# Patient Record
(demographics unavailable — no encounter records)

---

## 2025-07-24 NOTE — ASSESSMENT
[FreeTextEntry1] : Mr. Godinez is a 20-year-old who presents with subacute headaches, imbalance, tremulousness, loss of dexterity and rare tinnitus.  His examination is notable for mild motor slowness, hypertonia and significant hyperreflexia.  He is not dysarthric and does not exhibit ocular motility abnormalities or significant ataxia.  It is possible that his symptoms are the consequence of a Chiari I malformation.  I would like to review his preoperative imaging studies.  In light of his family history, I would raise the possibility of a hereditary ataxia or myelopathy disorder.  I suggested that he undergo a [facility] genome study.  He will retrieve past serologies for my review.  Further management will depend upon these results and his clinical course.

## 2025-07-24 NOTE — HISTORY OF PRESENT ILLNESS
[FreeTextEntry1] : Mr. Chong Godinez is a 20-year-old right-handed gentleman who was referred for neurologic evaluation at your kind suggestion.  He was accompanied by his father Dell ([phone]) and mother Donya ([phone]).  Mr. Godinez was well until early 2023 when he began noting frontal and occipital pressure-like headaches.  His condition worsened in October 2024 when he was diagnosed with mycoplasma pneumonia manifested by cough and fever which was treated with doxycycline.  He began experiencing mild dizziness and imbalance, deterioration in fine motor control, upper extremity tremulousness and nausea.  He had a sensation of fullness in his throat but denied dysarthria or dysphagia.  He experienced rare episodes of tinnitus.  He denied diplopia or oscillopsia.  He described mild urinary frequency.  He was evaluated by a neurologist Dr. Tam Jimenez, a neurologist at [facility].  He underwent an MRI of the brain which was interpreted as Chiari I malformation.  The images and report were not available at the time of this consultation.  He was referred to Dr. Gianni Diallo, a neurosurgeon.  On March 9, 2025, he underwent MRIs of the cervical and thoracic spine at [facility].  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  The cervical and thoracic studies were otherwise normal.  On March 25, 2025, he underwent a posterior craniectomy.  Postoperatively, his occipital headaches improved but he has constant mild frontal headaches with daily nausea.  He still complains of imbalance, poor fine motor control, occasional tinnitus and urinary frequency.  Past surgical history notable for posterior craniotomy for Chiari I malformation.  There is no history of hypertension, hyperlipidemia, cardiac, pulmonary, renal, hepatic, gastrointestinal, thyroid, hematologic or cerebrovascular disease.  He has no allergies.  He takes no medications.  He is a daily marijuana smoker.  He does not drink.  He is a high school graduate and is unemployed.  Family history is notable for a father with metastatic sarcoma.  His mother is healthy.  He has an 11-year-old brother and a 15-year-old sister who are both healthy.  He has 2 maternal second cousins with Chiari malformations.

## 2025-07-24 NOTE — CONSULT LETTER
[Dear  ___] : Dear  [unfilled], [Consult Letter:] : I had the pleasure of evaluating your patient, [unfilled]. [Please see my note below.] : Please see my note below. [Consult Closing:] : Thank you very much for allowing me to participate in the care of this patient.  If you have any questions, please do not hesitate to contact me. [Sincerely,] : Sincerely, [FreeTextEntry3] : Zak Eaton M.D. [DrBarry  ___] : Dr. HENRIQUEZ [DrBarry ___] : Dr. HENRIQUEZ

## 2025-07-24 NOTE — PHYSICAL EXAM
[FreeTextEntry1] : Constitutional:  Patient was well-developed, well-nourished and in no acute distress.  He was thin.  Head:  Normocephalic, atraumatic. Tympanic membranes were clear.   Neck:  Supple with full range of motion.   Cardiovascular:  Cardiac rhythm was regular without murmur. There were no carotid bruits. Peripheral pulses were full and symmetric.   Respiratory:  Lungs were clear.   Abdomen:  Soft and nontender.   Spine:  Nontender.   Skin:  There were no rashes.   NEUROLOGICAL EXAMINATION:  Mental Status: Patient was alert and oriented. Speech was fluent. There was no dysarthria.   Cranial Nerves:   II: Visual acuity was 20/20 bilaterally with near card. Pupils were equal and reactive. Visual fields were full. Funduscopic examination was normal.   III, IV, VI:  Eye movements were full without nystagmus.  There was no ocular dysmetria or skew deviation.  V: Facial sensation was intact.   VII: Facial strength was normal.   VIII: Hearing was equal.   IX, X: Palatal movement was normal. Phonation was normal.   XI: Sternocleidomastoids and trapezii were normal.   XII: Tongue was midline and movements mildly slow. There was no lingual atrophy or fasciculations.   Motor Examination: In the upper extremities, muscle bulk, tone and strength were normal.  There were mildly decreased fine finger movements in the left more than right hand.  There was mildly increased tone in the right leg compared to the left.  Strength was full.  There was a fine postural tremor in his outstretched hands.  Sensory Examination: Pinprick, vibration and joint position sense were intact.   Reflexes: DTRs were 2+ at the triceps, 2 at the biceps and brachioradialis, 3 at the ankles and there was nonsustained clonus at the knees.  There was mild spread to the finger flexors.  Plantar Responses: Plantar responses were flexor.   Coordination/Cerebellar Function: There was no dysmetria on finger to nose or heel to shin testing.   Gait/Stance: Gait and tandem were normal. Romberg was negative.